# Patient Record
Sex: MALE | URBAN - METROPOLITAN AREA
[De-identification: names, ages, dates, MRNs, and addresses within clinical notes are randomized per-mention and may not be internally consistent; named-entity substitution may affect disease eponyms.]

---

## 2022-12-15 ENCOUNTER — APPOINTMENT (OUTPATIENT)
Dept: URBAN - METROPOLITAN AREA CLINIC 319 | Age: 77
Setting detail: DERMATOLOGY
End: 2022-12-15

## 2022-12-15 PROBLEM — C44.42 SQUAMOUS CELL CARCINOMA OF SKIN OF SCALP AND NECK: Status: ACTIVE | Noted: 2022-12-15

## 2022-12-15 PROCEDURE — OTHER MOHS SURGERY: OTHER

## 2022-12-15 PROCEDURE — 17311 MOHS 1 STAGE H/N/HF/G: CPT

## 2022-12-15 PROCEDURE — 12044 INTMD RPR N-HF/GENIT7.6-12.5: CPT

## 2022-12-15 PROCEDURE — 17312 MOHS ADDL STAGE: CPT

## 2022-12-15 NOTE — PROCEDURE: MOHS SURGERY
Stage 1 Override Histology Text: There were glandular structures and heavy inflammation at the deep margin.

## 2022-12-15 NOTE — HPI: SKIN LESION (SQUAMOUS CELL CARCINOMA)
Is This A New Presentation, Or A Follow-Up?: Squamous Cell Carcinoma
Additional History: Referred by Kyle Mendoza MD
When Was Squamous Cell Carcinoma Biopsied? (Optional): 10/14/22
Accession # (Optional): [id number]